# Patient Record
Sex: MALE
[De-identification: names, ages, dates, MRNs, and addresses within clinical notes are randomized per-mention and may not be internally consistent; named-entity substitution may affect disease eponyms.]

---

## 2018-07-23 ENCOUNTER — HOSPITAL ENCOUNTER (INPATIENT)
Dept: HOSPITAL 42 - ED | Age: 48
LOS: 2 days | Discharge: HOME | DRG: 603 | End: 2018-07-25
Attending: INTERNAL MEDICINE | Admitting: INTERNAL MEDICINE
Payer: MEDICAID

## 2018-07-23 VITALS — BODY MASS INDEX: 38 KG/M2
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
## 2018-07-23 DIAGNOSIS — K74.60: ICD-10-CM

## 2018-07-23 DIAGNOSIS — F10.10: ICD-10-CM
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
## 2018-07-23 DIAGNOSIS — G47.30: ICD-10-CM

## 2018-07-23 DIAGNOSIS — S81.812A: ICD-10-CM
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
## 2018-07-23 DIAGNOSIS — D61.818: ICD-10-CM

## 2018-07-23 DIAGNOSIS — Z82.49: ICD-10-CM

## 2018-07-23 DIAGNOSIS — L03.116: Primary | ICD-10-CM
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
## 2018-07-23 LAB
ALBUMIN SERPL-MCNC: 3.5 G/DL (ref 3–4.8)
ALBUMIN/GLOB SERPL: 0.9 {RATIO} (ref 1.1–1.8)
ALT SERPL-CCNC: 56 U/L (ref 7–56)
AST SERPL-CCNC: 135 U/L (ref 17–59)
BASOPHILS # BLD AUTO: 0.02 K/MM3 (ref 0–2)
BASOPHILS NFR BLD: 1.4 % (ref 0–3)
BILIRUB DIRECT SERPL-MCNC: 1.3 MG/DL (ref 0–0.4)
BUN SERPL-MCNC: 9 MG/DL (ref 7–21)
CALCIUM SERPL-MCNC: 8.4 MG/DL (ref 8.4–10.5)
EOSINOPHIL # BLD: 0.1 10*3/UL (ref 0–0.7)
EOSINOPHIL NFR BLD: 4.8 % (ref 1.5–5)
ERYTHROCYTE [DISTWIDTH] IN BLOOD BY AUTOMATED COUNT: 15 % (ref 11.5–14.5)
GFR NON-AFRICAN AMERICAN: > 60
GRANULOCYTES # BLD: 0.91 10*3/UL (ref 1.4–6.5)
GRANULOCYTES NFR BLD: 62.3 % (ref 50–68)
HGB BLD-MCNC: 13.4 G/DL (ref 14–18)
LYMPHOCYTES # BLD: 0.3 10*3/UL (ref 1.2–3.4)
LYMPHOCYTES NFR BLD AUTO: 19.9 % (ref 22–35)
MCH RBC QN AUTO: 31.2 PG (ref 25–35)
MCHC RBC AUTO-ENTMCNC: 35.1 G/DL (ref 31–37)
MCV RBC AUTO: 88.8 FL (ref 80–105)
MONOCYTES # BLD AUTO: 0.2 10*3/UL (ref 0.1–0.6)
MONOCYTES NFR BLD: 11.6 % (ref 1–6)
PLATELET # BLD: 47 10^3/UL (ref 120–450)
PMV BLD AUTO: 10 FL (ref 7–11)
RBC # BLD AUTO: 4.3 10^6/UL (ref 3.5–6.1)
WBC # BLD AUTO: 1.5 10^3/UL (ref 4.5–11)

## 2018-07-23 NOTE — CP.PCM.HP
<José Miguel Morris - Last Filed: 07/23/18 16:34>





History of Present Illness





- History of Present Illness


History of Present Illness: 


José Miguel Morris DO PGY-1, Family Medicine Resident


Medicine History and Physical





48 y o male PMhx sleep apnea, alcohol abuse presents to the ED c/o L anterior 

shin pain and swelling x 3 weeks. Pt states he was on top of a bar stool at 

home adjusting a vent on an air conditioning unit when he lost his balance, the 

stool fell underneath him, and hit his leg in the L anterior shin area. Pt 

states for the past several weeks, the area did not heal well. Pt c/o "scab" in 

area where he bumped his leg, denies bleeding, states the scab was there first, 

and then his leg became erythematous around it, from distal to his L knee to 

proximal to L lower anterior calf. Pt has only been applying neosporin to 

affected area. Pt states he does not follow with a PCP currently. Pt's 

girlfriend states at bedside that he came today because it was not improving, 

and that pt's girlfriend and pt's mother were concerned about the swelling. 

Denies fever or chills. Denies numbness/tingling/decreased sensation to 

affected area. States he has not had pain with ambulation since this started. 

Denies hx of this occurring before. Denies prior hx of cellulitis. Pt states he 

had bone marrow biopsy done 5 y ago as part of physical exam at [facility] and he had 

history of low WBCs. Pt admits to drinking a 6 pack of beer almost every day 

during the week, admits to occasionally mixing with liquor. Reports last drink 

of alcohol was at 2 am this morning. Denies headache, dizziness, palpitations, 

diaphoresis, chest pain, shortness of breath, n/v/d/c, abd pain, urinary 

complaints, tremors, or other symptoms.





PMhx: Sleep apnea, EtOH abuse


PSurgHx: denies


Allergies: NKDA


Meds: none


Fam hx: no pertinent family hx


Soc hx: sexually active with girlfriend, does not use condoms, denies hx of STDs

, last HIV test neg as per patient 4 mos ago





 





Present on Admission





- Present on Admission


Any Indicators Present on Admission: No





Review of Systems





- Constitutional


Constitutional: absent: As Per HPI, Anorexia, Chills, Daytime Sleepiness, 

Excessive Sweating, Fatigue, Fever, Frequent Falls, Headache, Increased Appetite

, Lethargy, Malaise, Night Sweats, Snoring, Sleep Apnea, Weight Gain, Weight 

Loss, Weakness, Other





- Cardiovascular


Cardiovascular: absent: As Per HPI, Acrocyanosis, Chest Pain, Chest Pain at Rest

, Chest Pain with Activity, Claudication, Diaphoresis, Dyspnea, Dyspnea on 

Exertion, Edema, Irregular Heart Rhythm, Pain Radiating to Arm/Neck/Jaw, Leg 

Edema, Leg Ulcers, Lightheadedness, Orthopnea, Palpitations, Paroxysmal 

Nocturnal Dyspnea, Pedal Edema, Radiating Pain, Rapid Heart Rate, Slow Heart 

Rate, Syncope, Other





- Respiratory


Respiratory: absent: As Per HPI, Cough, Dyspnea, Hemoptysis, Dyspnea on Exertion

, Wheezing, Snoring, Stridor, Pain on Inspiration, Chest Congestion, Excessive 

Mucous Production, Change in Mucous Color, Pain with Coughing, Other





- Gastrointestinal


Gastrointestinal: absent: As Per HPI, Abdominal Pain, Belching, Bloating, 

Change in Bowel Habits, Change in Stool Character, Coffee Ground Emesis, 

Constipation, Cramping, Diarrhea, Dyspepsia, Dysphagia, Early Satiety, 

Excessive Flatus, Fecal Incontinence, Heartburn, Hematemesis, Hematochezia, 

Loose Stools, Melena, Nausea, Odynophagia, Temesmus, Vomiting, Other





- Integumentary


Integumentary: Erythema, New Lesions





- Neurological


Neurological: absent: As Per HPI, Abnormal Gait, Abnormal Hearing, Abnormal 

Movements, Abnormal Speech, Behavioral Changes, Burning Sensations, Confusion, 

Convulsions, Disequilibrium, Dizziness, Numbness, Focal Weakness, Frequent Falls

, Headaches, Lack of Coordination, Loss of Vision, Memory Loss, Paresthesias, 

Radicular Pain, Restless Legs, Sensory Deficit, Syncope, Tingling, Tremor, 

Vertigo, Weakness, Other Visual Disturbances, Other





Past Patient History





- Infectious Disease


Hx of Infectious Diseases: None





- Past Social History


Smoking Status: Never Smoked





- PULMONARY


Hx Sleep Apnea: Yes





- PSYCHIATRIC


Hx Substance Use: No





- SURGICAL HISTORY


Hx Surgeries: No





Meds


Allergies/Adverse Reactions: 


 Allergies











Allergy/AdvReac Type Severity Reaction Status Date / Time


 


No Known Allergies Allergy   Verified 07/23/18 16:16














Physical Exam





- Constitutional


Appears: Non-toxic, No Acute Distress





- Head Exam


Head Exam: ATRAUMATIC, NORMAL INSPECTION, NORMOCEPHALIC





- Eye Exam


Eye Exam: EOMI, Normal appearance, PERRL


Additional comments: 


Negative for scleral icterus b/l





- ENT Exam


ENT Exam: Mucous Membranes Moist, Normal Oropharynx





- Neck Exam


Neck exam: Positive for: Full Rom, Normal Inspection





- Respiratory Exam


Respiratory Exam: Clear to Auscultation Bilateral, NORMAL BREATHING PATTERN





- Cardiovascular Exam


Cardiovascular Exam: REGULAR RHYTHM, +S1, +S2





- GI/Abdominal Exam


GI & Abdominal Exam: Normal Bowel Sounds, Soft





- Extremities Exam


Extremities exam: Positive for: full ROM, normal capillary refill, pedal pulses 

present


Additional comments: 


Lesion 2-3 cm in size surrounded by 10-14 cm area of erythema, no induration or 

fluctuance, firm, not warm to palpation, no abnormal discharge noted on exam





- Back Exam


Back exam: FULL ROM, NORMAL INSPECTION





- Neurological Exam


Neurological exam: Alert, CN II-XII Intact, Oriented x3





- Psychiatric Exam


Psychiatric exam: Normal Affect, Normal Mood





- Skin


Skin Exam: Dry, Intact, Warm





Results





- Vital Signs


Recent Vital Signs: 





 Last Vital Signs











Temp  97.9 F   07/23/18 11:51


 


Pulse  74   07/23/18 15:38


 


Resp  18   07/23/18 15:38


 


BP  127/54 L  07/23/18 15:38


 


Pulse Ox  100   07/23/18 15:38














- Labs


Result Diagrams: 


 07/23/18 12:30





 07/23/18 12:30





Assessment & Plan





- Assessment and Plan (Free Text)


Assessment: 


48 y o male PMhx sleep apnea, EtOH abuse, presenting with 3 weeks of L anterior 

shin pain and swelling x 3 weeks. Pt admitted for cellulitis and treatment with 

IV antibiotics. Pt to also be monitored for alcohol withdrawal symptoms. 


Plan: 


L lower leg cellulitis


S/p vancomycin in ED


Start clindamycin 600 mg IVPB q 8 h


Pt received tetanus booster vaccine in ED


Pt leukopenic, thrombocytopenic on admission, continue to trend


ID consulted, recs appreciated


Pt afebrile, vitals stable, continue to monitor


Blood cx ordered x2


Podiatry consulted not needed at this time due to XR not showing evidence of 

osteomyelitis in foot 





Leukopenic, thrombocytopenic


Continue to trend


Lovenox and Protonix held due to low cell counts, will reassess in am


Likely 2/2 to EtOH abuse vs. immunocompromised state


HIV, alcohol level pending





Alcohol withdrawal


Last drink consumed by pt at 2 am this morning


Alcohol level pending


CIWA protocol


Ativan 2 mg q 6 h PRN


Seizure/fall/aspiration precautions


Regular diet


Pt not demonstrating signs or symptoms of withdrawal at this time, continue to 

monitor





Elevated AST


Likely 2/2 to EtOH abuse


Hepatitis panel pending


Pt not c/o RUQ pain


Continue to trend





Hyperbilirubinemia


Continue to trend


Pt not c/o RUQ pain





GI/DVT ppx: Protonix and Lovenox held due to low cell counts, will reassess in 

am. Pt encouraged to ambulate for DVT ppx.





Pt seen, examined with, and plan discussed with Dr. Carlson, attending.





José Miguel Morris DO PGY-1, Family Medicine Resident


Pager #[phone]





<Dana Carlson - Last Filed: 07/23/18 17:26>





Results





- Vital Signs


Recent Vital Signs: 





 Last Vital Signs











Temp  97.9 F   07/23/18 11:51


 


Pulse  74   07/23/18 15:38


 


Resp  18   07/23/18 15:38


 


BP  127/54 L  07/23/18 15:38


 


Pulse Ox  100   07/23/18 15:38














- Labs


Result Diagrams: 


 07/23/18 12:30





 07/23/18 12:30





Attending/Attestation





- Attestation


I have personally seen and examined this patient.: Yes


I have fully participated in the care of the patient.: Yes


I have reviewed all pertinent clinical information: Yes


Notes (Text): 





07/23/18 17:22


48 year old male with past medical history of alcohol abuse who presents with 

left anterior shin cellulitis.


Xray was negative for evidence of osteomyelitis and LE doppler was also 

negative.


He is started on iv antibiotics.  ID evaluation is requested.


He has leukopenia and thrombocytopenia, possibly secondary to ETOH bone marrow 

suppression vs infection.


HIV and hepatitis panel is ordered.  Monitor LFTs.  Alcohol level and urine 

drug screen is ordered.


Monitor for alcohol withdrawal symptoms.  He was counselled on alcohol 

abstinence.





Dana Carlson MD


Hospitalist.

## 2018-07-23 NOTE — RAD
Date of service: 



07/23/2018



PROCEDURE:  Radiographs of the left tibia and fibula. 



HISTORY:

lt. tibial swelling



COMPARISON:

None available.



TECHNIQUE:

Frontal and lateral views obtained. 



FINDINGS:



BONES:

No fracture or destructive lesion.



JOINT SPACES:

Unremarkable.



OTHER FINDINGS:

None.



IMPRESSION:

Unremarkable radiographs of the left tibia and fibula.

## 2018-07-23 NOTE — ED PDOC
Arrival/HPI





- General


Chief Complaint: Abnormal Skin Integrity


Time Seen by Provider: 07/23/18 11:54


Historian: Patient





- History of Present Illness


Narrative History of Present Illness (Text): 





07/23/18 12:09


49 y/o male, no significant pmh, nkda, last tetanus over 10 years ago, c/o lt. 

anterior shin pain and swelling x 3 weeks.  Pt. stated that he accidentally hit 

on the wooden chair corner about 3 weeks ago, sustained abrasion which he has 

been self treating with the neosporin at home, still not healing, no fever or 

chills, no night sweat, no rash, no numbness or tingling, no palpitation, no 

other medical or psychological complaints. 





Past Medical History





- Provider Review


Nursing Documentation Reviewed: Yes





- Infectious Disease


Hx of Infectious Diseases: None





- Pulmonary


Hx Sleep Apnea: Yes





- Psychiatric


Hx Substance Use: No





Family/Social History





- Physician Review


Nursing Documentation Reviewed: Yes


Family/Social History: Unknown Family HX


Smoking Status: Never Smoked


Hx Alcohol Use: Yes


Frequency of alcohol use: Socially


Hx Substance Use: No





Allergies/Home Meds


Allergies/Adverse Reactions: 


Allergies





No Known Allergies Allergy (Verified 07/23/18 11:50)


 








Home Medications: 


 Home Meds











 Medication  Instructions  Recorded  Confirmed


 


No Known Home Med  07/23/18 07/23/18














Review of Systems





- Review of Systems


Constitutional: absent: Fatigue, Fevers


Eyes: absent: Vision Changes


ENT: absent: Hearing Changes


Respiratory: absent: SOB, Cough


Cardiovascular: absent: Chest Pain


Gastrointestinal: absent: Abdominal Pain, Nausea, Vomiting


Skin: Rash, Skin Lesions, Cellulitis.  absent: Pruritis, Laceration, Abscess, 

Ulcer


Neurological: absent: Headache, Dizziness


Psychiatric: absent: Anxiety, Depression, Suicidal Ideation





Physical Exam


Vital Signs Reviewed: Yes


Vital Signs











  Temp Pulse Resp BP Pulse Ox


 


 07/23/18 11:51  97.9 F  82  18  142/80  96











Temperature: Afebrile


Blood Pressure: Normal


Pulse: Regular


Respiratory Rate: Normal


Appearance: Positive for: Well-Appearing, Non-Toxic, Comfortable


Pain Distress: Mild


Mental Status: Positive for: Alert and Oriented X 3





- Systems Exam


Head: Present: Atraumatic, Normocephalic


Pupils: Present: PERRL


Extroacular Muscles: Present: EOMI


Conjunctiva: Present: Normal


Mouth: Present: Moist Mucous Membranes


Neck: Present: Normal Range of Motion


Respiratory/Chest: Present: Clear to Auscultation, Good Air Exchange.  No: 

Respiratory Distress, Accessory Muscle Use


Cardiovascular: Present: Regular Rate and Rhythm, Normal S1, S2.  No: Murmurs


Abdomen: No: Tenderness, Distention, Peritoneal Signs


Back: Present: Normal Inspection


Upper Extremity: Present: Normal Inspection.  No: Cyanosis, Edema


Lower Extremity: Present: Normal Inspection, Other (Lt. shin: anteriorly noted 

to have 4cm superficial healing abrasion with surround erythematous approx. 

15wyt84mo superficial cellulitis, no streaking or ulcers, FROM without 

limitation, sensation intact, motor 5/5, +DPPT pulses, capillary refill< 2 

seconds, neurovasuclar intact. ).  No: Edema


Neurological: Present: GCS=15, CN II-XII Intact, Speech Normal


Skin: Present: Warm, Dry, Normal Color.  No: Rashes


Psychiatric: Present: Alert, Oriented x 3, Normal Insight, Normal Concentration





Medical Decision Making


ED Course and Treatment: 





07/23/18 12:18


Differential: DVT vs. Cellulitis vs. Fracture/foreign bodies


-The wound doesn't feel or palpate any foreign bodies


-Labs


-LLE venuous dopper


-Lt. tibia/fibula


-IV vancomyin/toradol/benadryl


-observe and reassess





07/23/18 14:25


-Lt.tibia/fibula xray: Unremarkable radiographs of the left tibia and fibula.


-LLE Venuous doppler: as per preliminary report, no acute DVT


-Labs are non-significant except wbc 1.5/platete 47, magnesium 2.3 with no 

previous comparisons


-Pt. has extensive cellulitis, needs IV antibiotic and observation


-Paging hospitalist for admission. 





07/23/18 14:46


-I spoke to the hospitalist Dr. Carlson, discussed about the case/labs/radiology 

result, agreed to admit to his service.


-Dr. Scanlon is busy, will put in the admission order for him for now. 








- Lab Interpretations


Lab Results: 








 07/23/18 12:30 





 07/23/18 12:30 





 Lab Results





07/23/18 12:30: WBC 1.5 L*, RBC 4.30, Hgb 13.4 L, Hct 38.2 L, MCV 88.8, MCH 31.2

, MCHC 35.1, RDW 15.0 H, Plt Count 47 L*, MPV 10.0, Gran % 62.3, Lymph % (Auto) 

19.9 L, Mono % (Auto) 11.6 H, Eos % (Auto) 4.8, Baso % (Auto) 1.4, Gran # 0.91 L

, Lymph # (Auto) 0.3 L, Mono # (Auto) 0.2, Eos # (Auto) 0.1, Baso # (Auto) 0.02


07/23/18 12:30: Sodium 142, Potassium 4.3, Chloride 106, Carbon Dioxide 28, 

Anion Gap 12, BUN 9, Creatinine 0.7 L, Est GFR ( Amer) > 60, Est GFR (Non

-Af Amer) > 60, Random Glucose 97, Calcium 8.4, Total Bilirubin 2.3 H,  H

, ALT 56, Alkaline Phosphatase 227 H, Total Protein 7.6, Albumin 3.5, Globulin 

4.1, Albumin/Globulin Ratio 0.9 L








I have reviewed the lab results: Yes





- RAD Interpretation


Radiology Orders: 








07/23/18 12:06


TIBIA FIBULA LEFT [RAD] Stat 


DUPLEX LOWER EXTRM VEIN LEFT [US] Stat 











Lt. tibia/fibula xray:





Date of service: 





07/23/2018





PROCEDURE:  Radiographs of the left tibia and fibula. 





HISTORY:


lt. tibial swelling





COMPARISON:


None available.





TECHNIQUE:


Frontal and lateral views obtained. 





FINDINGS:





BONES:


No fracture or destructive lesion.





JOINT SPACES:


Unremarkable.





OTHER FINDINGS:


None.





IMPRESSION:


Unremarkable radiographs of the left tibia and fibula.





--------------------------------------------------------------------------------

----------------------


LLE Venuous doppler: as per preliminary report, no acute DVT





: Radiologist





- Medication Orders


Current Medication Orders: 











Discontinued Medications





Diphenhydramine HCl (Benadryl)  25 mg IVP STAT STA


   Stop: 07/23/18 12:07


   Last Admin: 07/23/18 12:34  Dose: 25 mg





IVP Administration


 Document     07/23/18 12:34  EQ  (Rec: 07/23/18 12:34  EQ  [facility]


     Charges for Administration


      # of IVP Administrations                   1





Vancomycin HCl (Vancomycin 1gm)  1 gm in 250 mls @ 167 mls/hr IVPB STAT STA


   PRN Reason: Protocol


   Stop: 07/23/18 13:35


   Last Admin: 07/23/18 12:33  Dose: 167 mls/hr





eMAR Start Stop


 Document     07/23/18 12:33  EQ  (Rec: 07/23/18 12:33  EQ  [facility]


     Intravenous Solution


      Start Date                                 07/23/18


      Start Time                                 12:33





Ketorolac Tromethamine (Toradol)  30 mg IVP STAT STA


   Stop: 07/23/18 12:07


   Last Admin: 07/23/18 12:34  Dose: 30 mg





MAR Pain Assessment


 Document     07/23/18 12:34  EQ  (Rec: 07/23/18 12:34  EQ  [facility]


     Pain Reassessment


      Is this a pain reassessment?               No


     Sleep


      Is patient sleeping during reassessment?   No


     Presence of Pain


      Presence of Pain                           Yes


IVP Administration


 Document     07/23/18 12:34  EQ  (Rec: 07/23/18 12:34  EQ  [facility]


     Charges for Administration


      # of IVP Administrations                   1





Tetanus/Reduced Diphtheria/Acell Pertussis (Boostrix Vaccine Inj)  0.5 ml IM 

.ONCE ONE


   Stop: 07/23/18 12:16


   Last Admin: 07/23/18 12:33  Dose: 0.5 ml











- PA / NP / Resident Statement


MD/DO has reviewed & agrees with the documentation as recorded.





Disposition/Present on Arrival





- Present on Arrival


Any Indicators Present on Arrival: No


History of DVT/PE: No


History of Uncontrolled Diabetes: No


Urinary Catheter: No


History of Decub. Ulcer: No


History Surgical Site Infection Following: None





- Disposition


Have Diagnosis and Disposition been Completed?: Yes


Diagnosis: 


 Infected abrasion, Cellulitis, Thrombocytopenia, Leukopenia





Disposition: HOSPITALIZED


Disposition Time: 14:27


Patient Plan: Admission, Observation


Patient Problems: 


 Current Active Problems











Problem Status Onset


 


Infected abrasion Acute  


 


Cellulitis Acute  











Condition: STABLE


Discharge Instructions (ExitCare):  Cellulitis (ED)


Forms:  [facility] (English)

## 2018-07-23 NOTE — US
PROCEDURE:  Left lower extremity venous US



HISTORY:

Leg pain and swelling. Evaluate for DVT.



PHYSICIAN(S):  Dmitriy Peralta MD.



TECHNIQUE:

Duplex sonography and color-flow Doppler with graded compression were 

used to evaluate the deep venous system of the left lower extremity. 



FINDINGS:

The visualized deep venous system of the left lower extremity is 

sonographically normal and compressible. Normal wave forms and 

augmentation are seen. There is no sonographic evidence for deep 

venous thrombosis in the visualized segments of the left lower 

extremity.



IMPRESSION:

1. No sonographic evidence for deep venous thrombosis in the 

visualized segments of the left lower extremity.

## 2018-07-23 NOTE — CP.PCM.CON
History of Present Illness





- History of Present Illness


History of Present Illness: 


Infectious Disease Consultation:


July 23, 2018





49 yo  male with PMhx sleep apnea, alcohol abuse presents to the ED c/o 

L anterior shin pain and swelling x 3 weeks. Pt states he was on top of a bar 

stool at home adjusting a vent on an air conditioning unit when he lost his 

balance, the stool fell underneath him, and hit his leg in the L anterior shin 

area. Pt states for the past several weeks, the area did not heal well. Pt c/o 

"scab" in area where he bumped his leg, denies bleeding, states the scab was 

there first, and then his leg became erythematous around it, from distal to his 

L knee to proximal to L lower anterior calf. Pt has only been applying 

neosporin to affected area. Patient's girlfriend states that the laceration/

scab has not changed in size from the time of injury.  Pt states he does not 

follow with a PCP currently but was recently at Dr. Brady's office for 

sleep apnea testing and evaluation in June 2018. Pt's girlfriend states at 

bedside that he came today because it was not improving over the past 3 weeks, 

and that pt's girlfriend and pt's mother were concerned about the swelling. 

Denies fever or chills. Denies numbness/tingling/decreased sensation to 

affected area. States he has not had pain with ambulation since this started. 

Denies hx of this occurring before. Denies prior hx of cellulitis. Pt states he 

had bone marrow biopsy done 5 y ago as part of physical exam at [facility] [place], [place] and he had 

history of low WBCs. Pt admits to drinking a 6 pack of beer almost every day 

during the week, admits to occasionally mixing with liquor. Reports last drink 

of alcohol was at 2 am this morning. Denies headache, dizziness, palpitations, 

diaphoresis, chest pain, shortness of breath, n/v/d/c, abd pain, urinary 

complaints, tremors, or other symptoms.





PMHx: 


Sleep apnea, EtOH abuse, Circulation issues





PSHx:


None given.





Allergies: 


NKDA





Meds: 


none





Fam hx:


HTN - mother





Soc hx:


sexually active with girlfriend, does not use condoms, denies hx of STDs, last 

HIV test neg as per patient 4 mos ago


No tobacco or illicit drug use





ROS:


No fevers, chills, nausea, vomiting, diarrhea, headaches, dizziness, chest pain

, abdominal pain, melena, hematuria, hematemesis, hematochezia, depression, 

anxiety





Past Patient History





- Infectious Disease


Hx of Infectious Diseases: None





- Past Social History


Smoking Status: Never Smoked





- PULMONARY


Hx Sleep Apnea: Yes





- PSYCHIATRIC


Hx Substance Use: No





- SURGICAL HISTORY


Hx Surgeries: No





Meds


Allergies/Adverse Reactions: 


 Allergies











Allergy/AdvReac Type Severity Reaction Status Date / Time


 


No Known Allergies Allergy   Verified 07/23/18 16:16














- Medications


Medications: 


 Current Medications





Clindamycin Phosphate 600 mg/ (Sodium Chloride)  54 mls @ 102 mls/hr IVPB Q8 MONY


   PRN Reason: Protocol


   Last Admin: 07/23/18 16:41 Dose:  102 mls/hr


Lorazepam (Ativan)  2 mg IVP Q6H PRN; Protocol


   PRN Reason: Agitation











Physical Exam





- Constitutional


Appears: Non-toxic, No Acute Distress





- Head Exam


Head Exam: ATRAUMATIC, NORMOCEPHALIC


Additional comments: 


detentition below average.





- Eye Exam


Eye Exam: EOMI, PERRL


Pupil Exam: NORMAL ACCOMODATION, PERRL





- ENT Exam


ENT Exam: Mucous Membranes Moist, Normal External Ear Exam, TM's Normal 

Bilaterally





- Respiratory Exam


Respiratory Exam: Clear to Auscultation Bilateral, NORMAL BREATHING PATTERN.  

absent: Rales, Rhonchi, Wheezes





- Cardiovascular Exam


Cardiovascular Exam: REGULAR RHYTHM, RRR, +S1, +S2





- GI/Abdominal Exam


GI & Abdominal Exam: Normal Bowel Sounds, Soft.  absent: Distended, Tenderness





- Extremities Exam


Extremities exam: Positive for: full ROM


Additional comments: 





mild chronic venous stasis changes.  Scabbed laceration about 4 cm in length on 

anterior left lower leg.  No current drainage.  At time of my exam, no 

additional warmth or tenderness.





- Neurological Exam


Neurological exam: Alert, CN II-XII Intact, Oriented x3





- Psychiatric Exam


Psychiatric exam: Normal Affect, Normal Mood





- Skin


Skin Exam: Normal Color


Additional comments: 





except for anterior lower left leg with 4 cm laceration that is scabbed over.





Results





- Vital Signs


Recent Vital Signs: 


 Last Vital Signs











Temp  97.9 F   07/23/18 11:51


 


Pulse  74   07/23/18 15:38


 


Resp  18   07/23/18 15:38


 


BP  127/54 L  07/23/18 15:38


 


Pulse Ox  100   07/23/18 15:38














- Labs


Result Diagrams: 


 07/23/18 12:30





 07/23/18 12:30





Assessment & Plan





- Assessment and Plan (Free Text)


Assessment: 





49 yo  male with injury to left leg 3 weeks ago when he fell off a bar 

stool while trying to fix something in the house.  As per wife, the left leg 

has had a non-healing laceration on the anterior lower left leg.  Patient was 

only using Neosporin ointment for treatment.  Currently he has no major 

complaints.  Local wound care.  On Clindamycin for antibiotic care at this 

time.  Can continue with Clindamycin.





IF leg continues to improve, can consider use of Clindamycin 450 mg PO TID to 

continue treatment at this time.  Supportive care.





Thank you for allowing me to participate in the care of the patient, we will 

follow with you.

## 2018-07-24 VITALS — RESPIRATION RATE: 20 BRPM

## 2018-07-24 LAB
ALBUMIN SERPL-MCNC: 3.4 G/DL (ref 3–4.8)
ALBUMIN/GLOB SERPL: 0.8 {RATIO} (ref 1.1–1.8)
ALT SERPL-CCNC: 50 U/L (ref 7–56)
AST SERPL-CCNC: 118 U/L (ref 17–59)
BASOPHILS # BLD AUTO: 0.01 K/MM3 (ref 0–2)
BASOPHILS NFR BLD: 0.7 % (ref 0–3)
BUN SERPL-MCNC: 9 MG/DL (ref 7–21)
CALCIUM SERPL-MCNC: 8.6 MG/DL (ref 8.4–10.5)
EOSINOPHIL # BLD: 0.1 10*3/UL (ref 0–0.7)
EOSINOPHIL NFR BLD: 4.9 % (ref 1.5–5)
ERYTHROCYTE [DISTWIDTH] IN BLOOD BY AUTOMATED COUNT: 14.6 % (ref 11.5–14.5)
GFR NON-AFRICAN AMERICAN: > 60
GRANULOCYTES # BLD: 0.99 10*3/UL (ref 1.4–6.5)
GRANULOCYTES NFR BLD: 68.7 % (ref 50–68)
HEPATITIS A IGM: NEGATIVE
HEPATITIS B CORE AB: NEGATIVE
HEPATITIS C ANTIBODY: NEGATIVE
HGB BLD-MCNC: 13.5 G/DL (ref 14–18)
LYMPHOCYTES # BLD: 0.2 10*3/UL (ref 1.2–3.4)
LYMPHOCYTES NFR BLD AUTO: 15.3 % (ref 22–35)
MCH RBC QN AUTO: 31 PG (ref 25–35)
MCHC RBC AUTO-ENTMCNC: 35.2 G/DL (ref 31–37)
MCV RBC AUTO: 88.1 FL (ref 80–105)
MONOCYTES # BLD AUTO: 0.2 10*3/UL (ref 0.1–0.6)
MONOCYTES NFR BLD: 10.4 % (ref 1–6)
PLATELET # BLD EST: (no result) 10*3/UL
PLATELET # BLD: 44 10^3/UL (ref 120–450)
PMV BLD AUTO: 10.6 FL (ref 7–11)
RBC # BLD AUTO: 4.36 10^6/UL (ref 3.5–6.1)
WBC # BLD AUTO: 1.4 10^3/UL (ref 4.5–11)

## 2018-07-24 NOTE — CP.PCM.PN
<José Miguel Morris - Last Filed: 07/24/18 15:52>





Subjective





- Date & Time of Evaluation


Date of Evaluation: 07/24/18


Time of Evaluation: 07:45





- Subjective


Subjective: 


José Miguel Morris DO PGY-1, Family Medicine Resident


Medicine Progress Note





Pt seen and examined at bedside. Reports he has been ambulating out of bed to 

bathroom without concerns. Tolerating PO diet, denies pain in affected leg with 

cellulitis. No acute events reported overnight. 





Objective





- Vital Signs/Intake and Output


Vital Signs (last 24 hours): 


 











Temp Pulse Resp BP Pulse Ox


 


 97.9 F   68   18   110/75   97 


 


 07/24/18 14:00  07/24/18 14:00  07/24/18 14:00  07/24/18 14:00  07/24/18 14:00








Intake and Output: 


 











 07/24/18 07/24/18





 06:59 18:59


 


Intake Total 660 300


 


Balance 660 300














- Medications


Medications: 


 Current Medications





Clindamycin Phosphate 600 mg/ (Sodium Chloride)  54 mls @ 102 mls/hr IVPB Q8 MONY


   PRN Reason: Protocol


   Last Admin: 07/24/18 13:40 Dose:  102 mls/hr


Lorazepam (Ativan)  2 mg IVP Q6H PRN; Protocol


   PRN Reason: Agitation











- Labs


Labs: 


 





 07/24/18 05:00 





 07/24/18 05:00 











- Constitutional


Appears: Non-toxic, No Acute Distress





- Eye Exam


Eye Exam: EOMI, Normal appearance, PERRL





- ENT Exam


ENT Exam: Mucous Membranes Moist, Normal Oropharynx





- Neck Exam


Neck Exam: Full ROM, Normal Inspection





- Respiratory Exam


Respiratory Exam: Clear to Ausculation Bilateral, NORMAL BREATHING PATTERN





- Cardiovascular Exam


Cardiovascular Exam: REGULAR RHYTHM, +S1, +S2





- GI/Abdominal Exam


GI & Abdominal Exam: Soft, Normal Bowel Sounds





- Extremities Exam


Extremities Exam: Full ROM, Normal Capillary Refill, Normal Inspection


Additional comments: 


Improving LLE cellulitis, c/d/i, no discharge, no fluctuance, decreased size of 

erythema around site





- Neurological Exam


Neurological Exam: Alert, Awake, CN II-XII Intact, Normal Gait, Oriented x3





- Psychiatric Exam


Psychiatric exam: Normal Affect, Normal Mood





- Skin


Skin Exam: Dry, Intact, Warm





Assessment and Plan





- Assessment and Plan (Free Text)


Assessment: 


48 y o male PMhx sleep apnea, EtOH abuse, presenting with 3 weeks of L anterior 

shin pain and swelling x 3 weeks. Pt admitted for cellulitis and treatment with 

IV antibiotics. Pt being monitored for alcohol withdrawal symptoms. 


Plan: 


L lower leg cellulitis


S/p vancomycin in ED


C/w clindamycin 600 mg IVPB q 8 h


Per ID rec: pt can be discharged on clindamycin 450 mg PO tid for total course 

of 14 days of treatment


Pt received tetanus booster vaccine, pneumococcal vaccine in ED


Pt leukopenic, thrombocytopenic on admission, likely chronic


Reviewed lab results and bone marrow bx from Dr. Pompa's office (Heme/Onc), pt 

has hx chronic pancytopenia


ID consulted, recs appreciated


Pt afebrile, vitals stable, continue to monitor


Blood cx ordered x2


Podiatry consulted not needed at this time due to XR not showing evidence of 

osteomyelitis in foot 





Leukopenic, thrombocytopenic


Continue to trend


Lovenox and Protonix held due to low cell counts, will reassess in am


Likely 2/2 to EtOH abuse vs. immunocompromised state


HIV test neg, alcohol level elevated


Heme/Onc consulted, recs appreciated





Alcohol withdrawal


Last drink consumed by pt at 2 am on day of admission


Alcohol level elevated


CIWA protocol


Ativan 2 mg q 6 h PRN


Seizure/fall/aspiration precautions


Regular diet


Pt not demonstrating signs or symptoms of withdrawal at this time, continue to 

monitor





Elevated AST


Likely 2/2 to EtOH abuse


Hepatitis panel neg


Pt not c/o RUQ pain


U/s spleen/liver pending


Continue to trend





Hyperbilirubinemia


Continue to trend


Pt not c/o RUQ pain


Pending spleen/liver sono





GI/DVT ppx: Protonix and Lovenox held due to low cell counts, will reassess. Pt 

encouraged to ambulate for DVT ppx.





Pt seen, examined with, and plan discussed with Dr. Lopez, attending. 





José Miguel Morris DO PGY-1, Family Medicine Resident


Pager #[phone]





<Reggie Lopez - Last Filed: 07/25/18 13:16>





Objective





- Vital Signs/Intake and Output


Vital Signs (last 24 hours): 


 











Temp Pulse Resp BP Pulse Ox


 


 98.5 F   71   20   116/71   94 L


 


 07/25/18 06:00  07/25/18 06:00  07/25/18 06:00  07/25/18 06:00  07/25/18 06:00








Intake and Output: 


 











 07/25/18 07/25/18





 06:59 18:59


 


Intake Total 840 


 


Balance 840 














- Medications


Medications: 


 Current Medications





Clindamycin Phosphate 600 mg/ (Sodium Chloride)  54 mls @ 102 mls/hr IVPB Q8 MONY


   PRN Reason: Protocol


   Last Admin: 07/25/18 05:36 Dose:  102 mls/hr


Lorazepam (Ativan)  2 mg IVP Q6H PRN; Protocol


   PRN Reason: Agitation











- Labs


Labs: 


 





 07/25/18 07:50 





 07/25/18 07:50 











Attending/Attestation





- Attestation


I have personally seen and examined this patient.: Yes


I have fully participated in the care of the patient.: Yes


I have reviewed all pertinent clinical information, including history, physical 

exam and plan: Yes


Notes (Text): 





07/25/18 13:13





Medical record note made by the resident after discussion with my direction and 

input after the patient was personally seen and examined by me. I have reviewed 

the chart and agree that the record accurately reflects by personal performance 

of the history, physical exam, data review, and medical decision-making, in the 

course for the patient. I have also personally directed the plan of care.


48 year old male with past medical history of alcohol abuse who presents with 

left anterior shin cellulitis.Xray was negative for evidence of osteomyelitis 

and LE Doppler was also negative.Patient is responding well to IV antibiotics, 

redness and swelling is improving.





Pancytopenia is due to bone suppression by alcohol and underlying chronic liver 

disease, we will monitor and get patient medical record from patient 

hematologist.





There is no sign of alcohol withdrawal at this time.





The issue of ongoing alcohol abuse was discussed in detail with patient.





Management plan was discussed in detail with patient. Education was provided.

## 2018-07-24 NOTE — US
Date of service: 



07/24/2018



HISTORY:

hyperbilirubin pancytopenia



COMPARISON:

None.



TECHNIQUE:

Sonographic evaluation of the abdomen.



FINDINGS:



LIVER:

Measures 15.9 cm.  Heterogeneously increased echogenicity of the 

liver parenchyma. Nodular contour consistent with hepatic cirrhosis. 

No mass.  No biliary ductal dilatation.



GALLBLADDER:

Unremarkable. No gallstones.



COMMON BILE DUCT:

Measures 5 mm. No stones. No dilatation.



PANCREAS:

Unremarkable as visualized. No mass. No ductal dilatation.



RIGHT KIDNEY:

Measures 13.0cm. Normal echogenicity. No calculus, mass, or 

hydronephrosis.



LEFT KIDNEY:

Measures 14.1cm. Normal echogenicity. No calculus, mass, or 

hydronephrosis.



SPLEEN:

Splenomegaly.  The spleen measures 21.5 cm in greatest dimension.



AORTA:

No aneurysmal dilatation. 



IVC:

Unremarkable. 



OTHER FINDINGS:

Varices noted in left upper quadrant of abdomen. 



IMPRESSION:

Hepatic cirrhosis. Varices.  Splenomegaly. No evidence of biliary 

obstruction.

## 2018-07-24 NOTE — CP.PCM.PN
Subjective





- Date & Time of Evaluation


Date of Evaluation: 07/24/18


Time of Evaluation: 13:30





- Subjective


Subjective: 


Infectious Disease Follow Up:


July 24, 2018





47 yo  male with PMhx sleep apnea, alcohol abuse presents to the ED c/o 

L anterior shin pain and swelling x 3 weeks. Pt states he was on top of a bar 

stool at home adjusting a vent on an air conditioning unit when he lost his 

balance, the stool fell underneath him, and hit his leg in the L anterior shin 

area. Pt states for the past several weeks, the area did not heal well. Pt c/o 

"scab" in area where he bumped his leg, denies bleeding, states the scab was 

there first, and then his leg became erythematous around it, from distal to his 

L knee to proximal to L lower anterior calf. Pt has only been applying 

neosporin to affected area. Patient's girlfriend states that the laceration/

scab has not changed in size from the time of injury.  Pt states he does not 

follow with a PCP currently but was recently at Dr. Brady's office for 

sleep apnea testing and evaluation in June 2018. Pt's girlfriend states at 

bedside that he came today because it was not improving over the past 3 weeks, 

and that pt's girlfriend and pt's mother were concerned about the swelling. 

Denies fever or chills. Denies numbness/tingling/decreased sensation to 

affected area. States he has not had pain with ambulation since this started. 

Denies hx of this occurring before. Denies prior hx of cellulitis. Pt states he 

had bone [place] 5 [place] [facility] [place] 

history of low WBCs. Pt admits to drinking a 6 pack of beer almost every day 

during the week, admits to occasionally mixing with liquor. Reports last drink 

of alcohol was at 2 am this morning. Denies headache, dizziness, palpitations, 

diaphoresis, chest pain, shortness of breath, n/v/d/c, abd pain, urinary 

complaints, tremors, or other symptoms.





The patient with no erythema, additional warmth, or palpational tenderness.  No 

drainage from the large laceration that is scabbed over.  Patient still with 

pain at site.  However no issues with walking or gait.





Objective





- Vital Signs/Intake and Output


Vital Signs (last 24 hours): 


 











Temp Pulse Resp BP Pulse Ox


 


 97.9 F   89   18   130/80   96 


 


 07/24/18 06:00  07/24/18 06:00  07/24/18 06:00  07/24/18 06:00  07/24/18 06:00








Intake and Output: 


 











 07/24/18 07/24/18





 06:59 18:59


 


Intake Total 660 


 


Balance 660 














- Medications


Medications: 


 Current Medications





Clindamycin Phosphate 600 mg/ (Sodium Chloride)  54 mls @ 102 mls/hr IVPB Q8 MONY


   PRN Reason: Protocol


   Last Admin: 07/24/18 05:31 Dose:  102 mls/hr


Lorazepam (Ativan)  2 mg IVP Q6H PRN; Protocol


   PRN Reason: Agitation











- Labs


Labs: 


 





 07/24/18 05:00 





 07/24/18 05:00 











- Constitutional


Appears: Non-toxic, No Acute Distress





- Head Exam


Head Exam: ATRAUMATIC, NORMOCEPHALIC


Additional comments: 





dentition below average.





- Eye Exam


Eye Exam: EOMI, PERRL


Pupil Exam: NORMAL ACCOMODATION, PERRL





- ENT Exam


ENT Exam: Mucous Membranes Moist, Normal External Ear Exam, TM's Normal 

Bilaterally





- Neck Exam


Neck Exam: Full ROM, Normal Inspection





- Respiratory Exam


Respiratory Exam: Clear to Ausculation Bilateral, NORMAL BREATHING PATTERN.  

absent: Rales, Rhonchi, Wheezes





- Cardiovascular Exam


Cardiovascular Exam: REGULAR RHYTHM, RRR, +S1, +S2





- GI/Abdominal Exam


GI & Abdominal Exam: Soft, Normal Bowel Sounds.  absent: Distended, Tenderness





- Extremities Exam


Extremities Exam: Full ROM


Additional comments: 


mild chronic venous stasis changes.  Scabbed laceration about 4 cm in length on 

anterior left lower leg.  No current drainage.  At time of my exam, no 

additional warmth or tenderness.





- Neurological Exam


Neurological Exam: Alert, Awake, CN II-XII Intact, Oriented x3





- Psychiatric Exam


Psychiatric exam: Normal Affect, Normal Mood





- Skin


Skin Exam: Normal Color


Additional comments: 


except for anterior lower left leg with 4 cm laceration that is scabbed over.





Assessment and Plan





- Assessment and Plan (Free Text)


Assessment: 


47 yo  male with injury to left leg 3 weeks ago when he fell off a bar 

stool while trying to fix something in the house.  As per wife, the left leg 

has had a non-healing laceration on the anterior lower left leg.  Patient was 

only using Neosporin ointment for treatment.  Currently he has no major 

complaints.  Local wound care.  On Clindamycin for antibiotic care at this 

time.  Can continue with Clindamycin.





IF leg continues to improve, can consider use of Clindamycin 450 mg PO TID to 

continue treatment on discharge for at least 14 days of treatment.  Supportive 

care.





Thank you for allowing me to participate in the care of the patient, we will 

follow with you.

## 2018-07-25 VITALS
OXYGEN SATURATION: 94 % | HEART RATE: 71 BPM | SYSTOLIC BLOOD PRESSURE: 116 MMHG | TEMPERATURE: 98.5 F | DIASTOLIC BLOOD PRESSURE: 71 MMHG

## 2018-07-25 LAB
ALBUMIN SERPL-MCNC: 3.3 G/DL (ref 3–4.8)
ALBUMIN/GLOB SERPL: 0.8 {RATIO} (ref 1.1–1.8)
ALT SERPL-CCNC: 48 U/L (ref 7–56)
AST SERPL-CCNC: 108 U/L (ref 17–59)
BASOPHILS # BLD AUTO: 0.01 K/MM3 (ref 0–2)
BASOPHILS NFR BLD: 0.5 % (ref 0–3)
BUN SERPL-MCNC: 9 MG/DL (ref 7–21)
CALCIUM SERPL-MCNC: 8.6 MG/DL (ref 8.4–10.5)
EOSINOPHIL # BLD: 0.2 10*3/UL (ref 0–0.7)
EOSINOPHIL NFR BLD: 7.4 % (ref 1.5–5)
ERYTHROCYTE [DISTWIDTH] IN BLOOD BY AUTOMATED COUNT: 14.6 % (ref 11.5–14.5)
GFR NON-AFRICAN AMERICAN: > 60
GRANULOCYTES # BLD: 1.32 10*3/UL (ref 1.4–6.5)
GRANULOCYTES NFR BLD: 65.4 % (ref 50–68)
HGB BLD-MCNC: 14.2 G/DL (ref 14–18)
LYMPHOCYTES # BLD: 0.4 10*3/UL (ref 1.2–3.4)
LYMPHOCYTES NFR BLD AUTO: 17.3 % (ref 22–35)
MCH RBC QN AUTO: 30.9 PG (ref 25–35)
MCHC RBC AUTO-ENTMCNC: 35.3 G/DL (ref 31–37)
MCV RBC AUTO: 87.4 FL (ref 80–105)
MONOCYTES # BLD AUTO: 0.2 10*3/UL (ref 0.1–0.6)
MONOCYTES NFR BLD: 9.4 % (ref 1–6)
PLATELET # BLD EST: (no result) 10*3/UL
PLATELET # BLD: 45 10^3/UL (ref 120–450)
PMV BLD AUTO: 9.6 FL (ref 7–11)
RBC # BLD AUTO: 4.6 10^6/UL (ref 3.5–6.1)
WBC # BLD AUTO: 2 10^3/UL (ref 4.5–11)

## 2018-07-25 NOTE — CP.PCM.DIS
<José Miguel Morris - Last Filed: 07/25/18 18:00>





Provider





- Provider


Date of Admission: 


07/23/18 16:57





Attending physician: 


Reggie Lopez MD





Consults: 


KATIE Khanna


Time Spent in preparation of Discharge (in minutes): 45





Hospital Course





- Lab Results


Lab Results: 


 Most Recent Lab Values











WBC  2.0 10^3/ul (4.5-11.0)  L* D 07/25/18  07:50    


 


RBC  4.60 10^6/uL (3.5-6.1)   07/25/18  07:50    


 


Hgb  14.2 g/dL (14.0-18.0)   07/25/18  07:50    


 


Hct  40.2 % (42.0-52.0)  L  07/25/18  07:50    


 


MCV  87.4 fl (80.0-105.0)   07/25/18  07:50    


 


MCH  30.9 pg (25.0-35.0)   07/25/18  07:50    


 


MCHC  35.3 g/dl (31.0-37.0)   07/25/18  07:50    


 


RDW  14.6 % (11.5-14.5)  H  07/25/18  07:50    


 


Plt Count  45 10^3/uL (120.0-450.0)  L*  07/25/18  07:50    


 


MPV  9.6 fl (7.0-11.0)   07/25/18  07:50    


 


Gran %  65.4 % (50.0-68.0)   07/25/18  07:50    


 


Lymph % (Auto)  17.3 % (22.0-35.0)  L  07/25/18  07:50    


 


Mono % (Auto)  9.4 % (1.0-6.0)  H  07/25/18  07:50    


 


Eos % (Auto)  7.4 % (1.5-5.0)  H  07/25/18  07:50    


 


Baso % (Auto)  0.5 % (0.0-3.0)   07/25/18  07:50    


 


Gran #  1.32  (1.4-6.5)  L  07/25/18  07:50    


 


Lymph # (Auto)  0.4  (1.2-3.4)  L  07/25/18  07:50    


 


Mono # (Auto)  0.2  (0.1-0.6)   07/25/18  07:50    


 


Eos # (Auto)  0.2  (0.0-0.7)   07/25/18  07:50    


 


Baso # (Auto)  0.01 K/mm3 (0.0-2.0)   07/25/18  07:50    


 


Platelet Evaluation  Low  (NORMAL)   07/25/18  07:50    


 


Sodium  138 mmol/L (132-148)   07/25/18  07:50    


 


Potassium  3.9 mmol/L (3.6-5.0)   07/25/18  07:50    


 


Chloride  106 mmol/L ()   07/25/18  07:50    


 


Carbon Dioxide  23 mmol/L (21-33)   07/25/18  07:50    


 


Anion Gap  13  (10-20)   07/25/18  07:50    


 


BUN  9 mg/dL (7-21)   07/25/18  07:50    


 


Creatinine  0.7 mg/dl (0.8-1.5)  L  07/25/18  07:50    


 


Est GFR ( Amer)  > 60   07/25/18  07:50    


 


Est GFR (Non-Af Amer)  > 60   07/25/18  07:50    


 


Random Glucose  97 mg/dL ()   07/25/18  07:50    


 


Calcium  8.6 mg/dL (8.4-10.5)   07/25/18  07:50    


 


Phosphorus  3.1 mg/dL (2.5-4.5)   07/23/18  12:30    


 


Magnesium  1.9 mg/dL (1.7-2.2)   07/23/18  12:30    


 


Total Bilirubin  3.3 mg/dL (0.2-1.3)  H  07/25/18  07:50    


 


Direct Bilirubin  1.3 mg/dL (0.0-0.4)  H  07/23/18  12:30    


 


AST  108 U/L (17-59)  H  07/25/18  07:50    


 


ALT  48 U/L (7-56)   07/25/18  07:50    


 


Alkaline Phosphatase  221 U/L ()  H  07/25/18  07:50    


 


Total Protein  7.5 g/dL (5.8-8.3)   07/25/18  07:50    


 


Albumin  3.3 g/dL (3.0-4.8)   07/25/18  07:50    


 


Globulin  4.2 gm/dL  07/25/18  07:50    


 


Albumin/Globulin Ratio  0.8  (1.1-1.8)  L  07/25/18  07:50    


 


Alcohol, Quantitative  64 mg/dL (0-10)  H  07/23/18  12:30    


 


Hepatitis A IgM Ab  Negative  (NEGATIVE)   07/23/18  12:30    


 


Hep Bs Antigen  Negative  (NEGATIVE)   07/23/18  12:30    


 


Hep B Core IgM Ab  Negative  (NEGATIVE)   07/23/18  12:30    


 


Hepatitis C Antibody  Negative  (NEGATIVE)   07/23/18  12:30    


 


HIV 1&2 Ag/Ab, 4th Gen  Nonreactive  (Nonreactive)   07/23/18  12:30    














- Hospital Course


Hospital Course: 


José Miguel Morris DO PGY-1, Family Medicine Resident


Medicine Discharge Summary





48 y o male PMhx sleep apnea, EtOH abuse, presented to ED on 7/23/18 with c/o L 

anterior shin pain and swelling x 3 weeks. Pt denied fevers or chills. Denied 

numbness/tingling/decreased sensation to affected area. Pt had no concerns or 

pain with ambulation. Pt stated he had a bone marrow bx done several years ago 

as part of physical exam at [facility] [place], and has had chronic hx of leukopenia. Pt 

admitted to drinking 6 pack of beer almost every day of the week. Pt was thus 

admitted for LE cellulitis and pancytopenia. Pt was given vancomycin in ED. On 

day 1 inpatient, pt was started on clindamycin 600 mg IVPB q 8 h. Pt also 

received tetanus booster and pneumococcal vaccines in ED. LE doppler u/s 

demonstrated no DVT. XR of  L tibia/fibula demonstrated no signs of 

osteomyelitis. On day 2, the inflammation was decreasing and pt stated he was 

feeling much better. Blood cultures were ordered but came back negative. Pt's 

leukopenia and thrombocytopenia was monitored throughout his stay with 

bloodwork. Pt's WBC count was low, but began to trend up due to cessation of 

alcohol during hospital stay. Peripheral blood smear was done and demonstrated 

normocytic, normochromic anemia. Pt was placed on CIWA protocol due to hx of 

alcohol abuse, placed on seizure/fall/aspiration precautions, and ativan prn 

for symptoms. Pt also had elevated AST on admission. Abd U/s demonstrated 

hepatic cirrhosis, varices, and splenomegaly. Pt did not report any abdominal 

pain or tenderness. Spleen was palpable on physical exam. Pt was discharged to 

home in stable condition on 7/25/18 and instructed to complete clindamycin for 

additional 5 days of therapy. Pt was educated about alcohol cessation. Pt was 

instructed to follow-up with [facility] for follow-up appt 

on 8/2/18 at 3:00 pm, and also to f/u with heme/onc as outpatient for hx 

chronic pancytopenia. 





Discharge Exam





- Head Exam


Head Exam: ATRAUMATIC, NORMOCEPHALIC





- Eye Exam


Eye Exam: EOMI, Normal appearance, PERRL





- ENT Exam


ENT Exam: Mucous Membranes Moist, Normal Oropharynx





- Neck Exam


Neck exam: Full Rom, Normal Inspection





- Respiratory Exam


Respiratory Exam: Clear to PA & Lateral, NORMAL BREATHING PATTERN





- Cardiovascular Exam


Cardiovascular Exam: REGULAR RHYTHM, +S1, +S2





- GI/Abdominal Exam


GI & Abdominal Exam: Normal Bowel Sounds, Soft





- Extremities Exam


Extremities exam: full ROM, normal capillary refill, pedal pulses present





- Neurological Exam


Neurological exam: Alert, CN II-XII Intact, Normal Gait, Oriented x3, Reflexes 

Normal





- Psychiatric Exam


Psychiatric exam: Normal Affect, Normal Mood





- Skin


Skin Exam: Dry, Intact, Normal Color, Warm





Discharge Plan





- Discharge Medications


Prescriptions: 


Clindamycin [Cleocin] 450 mg PO TID #15 cap


Folic Acid 1 mg PO DAILY #30 tab


Mv-Min/Folic/Vit K/Lycop/Coq10 [Daily Multivitamin Capsule] 1 each PO DAILY #30 

capsule


Thiamine [Vitamin B1 Tab] 50 mg PO DAILY #14 tab





- Follow Up Plan


Condition: STABLE


Disposition: HOME/ ROUTINE


Instructions:  Wound Care (DC), Bleeding Precautions, Cellulitis (DC), 

Cellulitis (GEN), Myelodysplastic Syndromes (DC), Myelodysplastic Syndromes (GEN

)





<Reggie Lopez - Last Filed: 07/26/18 15:11>





Provider





- Provider


Date of Admission: 


07/23/18 16:57





Attending physician: 


Reggie Lopez MD








Hospital Course





- Lab Results


Lab Results: 


 Most Recent Lab Values











WBC  2.0 10^3/ul (4.5-11.0)  L* D 07/25/18  07:50    


 


RBC  4.60 10^6/uL (3.5-6.1)   07/25/18  07:50    


 


Hgb  14.2 g/dL (14.0-18.0)   07/25/18  07:50    


 


Hct  40.2 % (42.0-52.0)  L  07/25/18  07:50    


 


MCV  87.4 fl (80.0-105.0)   07/25/18  07:50    


 


MCH  30.9 pg (25.0-35.0)   07/25/18  07:50    


 


MCHC  35.3 g/dl (31.0-37.0)   07/25/18  07:50    


 


RDW  14.6 % (11.5-14.5)  H  07/25/18  07:50    


 


Plt Count  45 10^3/uL (120.0-450.0)  L*  07/25/18  07:50    


 


MPV  9.6 fl (7.0-11.0)   07/25/18  07:50    


 


Gran %  65.4 % (50.0-68.0)   07/25/18  07:50    


 


Lymph % (Auto)  17.3 % (22.0-35.0)  L  07/25/18  07:50    


 


Mono % (Auto)  9.4 % (1.0-6.0)  H  07/25/18  07:50    


 


Eos % (Auto)  7.4 % (1.5-5.0)  H  07/25/18  07:50    


 


Baso % (Auto)  0.5 % (0.0-3.0)   07/25/18  07:50    


 


Gran #  1.32  (1.4-6.5)  L  07/25/18  07:50    


 


Lymph # (Auto)  0.4  (1.2-3.4)  L  07/25/18  07:50    


 


Mono # (Auto)  0.2  (0.1-0.6)   07/25/18  07:50    


 


Eos # (Auto)  0.2  (0.0-0.7)   07/25/18  07:50    


 


Baso # (Auto)  0.01 K/mm3 (0.0-2.0)   07/25/18  07:50    


 


Differential Comment  See pathology report   07/24/18  07:30    


 


Platelet Evaluation  Low  (NORMAL)   07/25/18  07:50    


 


Sodium  138 mmol/L (132-148)   07/25/18  07:50    


 


Potassium  3.9 mmol/L (3.6-5.0)   07/25/18  07:50    


 


Chloride  106 mmol/L ()   07/25/18  07:50    


 


Carbon Dioxide  23 mmol/L (21-33)   07/25/18  07:50    


 


Anion Gap  13  (10-20)   07/25/18  07:50    


 


BUN  9 mg/dL (7-21)   07/25/18  07:50    


 


Creatinine  0.7 mg/dl (0.8-1.5)  L  07/25/18  07:50    


 


Est GFR ( Amer)  > 60   07/25/18  07:50    


 


Est GFR (Non-Af Amer)  > 60   07/25/18  07:50    


 


Random Glucose  97 mg/dL ()   07/25/18  07:50    


 


Calcium  8.6 mg/dL (8.4-10.5)   07/25/18  07:50    


 


Phosphorus  3.1 mg/dL (2.5-4.5)   07/23/18  12:30    


 


Magnesium  1.9 mg/dL (1.7-2.2)   07/23/18  12:30    


 


Total Bilirubin  3.3 mg/dL (0.2-1.3)  H  07/25/18  07:50    


 


Direct Bilirubin  1.3 mg/dL (0.0-0.4)  H  07/23/18  12:30    


 


AST  108 U/L (17-59)  H  07/25/18  07:50    


 


ALT  48 U/L (7-56)   07/25/18  07:50    


 


Alkaline Phosphatase  221 U/L ()  H  07/25/18  07:50    


 


Total Protein  7.5 g/dL (5.8-8.3)   07/25/18  07:50    


 


Albumin  3.3 g/dL (3.0-4.8)   07/25/18  07:50    


 


Globulin  4.2 gm/dL  07/25/18  07:50    


 


Albumin/Globulin Ratio  0.8  (1.1-1.8)  L  07/25/18  07:50    


 


Alcohol, Quantitative  64 mg/dL (0-10)  H  07/23/18  12:30    


 


Hepatitis A IgM Ab  Negative  (NEGATIVE)   07/23/18  12:30    


 


Hep Bs Antigen  Negative  (NEGATIVE)   07/23/18  12:30    


 


Hep B Core IgM Ab  Negative  (NEGATIVE)   07/23/18  12:30    


 


Hepatitis C Antibody  Negative  (NEGATIVE)   07/23/18  12:30    


 


HIV 1&2 Ag/Ab, 4th Gen  Nonreactive  (Nonreactive)   07/23/18  12:30    














Attending/Attestation





- Attestation


I have personally seen and examined this patient.: Yes


I have fully participated in the care of the patient.: Yes


I have reviewed all pertinent clinical information, including history, physical 

exam and plan: Yes


Notes (Text): 





07/26/18 15:09


Medical record note made by the resident after discussion with my direction and 

input after the patient was personally seen and examined by me. I have reviewed 

the chart and agree that the record accurately reflects by personal performance 

of the history, physical exam, data review, and medical decision-making, in the 

course for the patient. I have also personally directed the plan of care.





48 year old male with past medical history of alcohol abuse who presents with 

left anterior shin cellulitis. Xray was negative for evidence of osteomyelitis 

and LE Doppler was also negative.Patient has responded well to IV antibiotics, 

redness and swelling is improved.





Pancytopenia is due to bone suppression by alcohol and underlying chronic liver 

disease,Neutropenia is improving.The issue of ongoing alcohol abuse was 

discussed in detail with patient.





There is no sign of alcohol withdrawal at this time.





Management plan was discussed in detail with patient. Education was provided.

## 2018-07-25 NOTE — CP.PCM.PN
Subjective





- Date & Time of Evaluation


Date of Evaluation: 07/25/18


Time of Evaluation: 12:00





- Subjective


Subjective: 


Infectious Disease Follow Up:


July 25, 2018





49 yo  male with PMhx sleep apnea, alcohol abuse presents to the ED c/o 

L anterior shin pain and swelling x 3 weeks. Pt states he was on top of a bar 

stool at home adjusting a vent on an air conditioning unit when he lost his 

balance, the stool fell underneath him, and hit his leg in the L anterior shin 

area. Pt states for the past several weeks, the area did not heal well. Pt c/o 

"scab" in area where he bumped his leg, denies bleeding, states the scab was 

there first, and then his leg became erythematous around it, from distal to his 

L knee to proximal to L lower anterior calf. Pt has only been applying 

neosporin to affected area. Patient's girlfriend states that the laceration/

scab has not changed in size from the time of injury.  Pt states he does not 

follow with a PCP currently but was recently at Dr. Brady's office for 

sleep apnea testing and evaluation in June 2018. Pt's girlfriend states at 

bedside that he came today because it was not improving over the past 3 weeks, 

and that pt's girlfriend and pt's mother were concerned about the swelling. 

Denies fever or chills. Denies numbness/tingling/decreased sensation to 

affected area. States he has not had pain with ambulation since this started. 

Denies hx of this occurring before. Denies prior hx of cellulitis. Pt states he 

had bone marrow biopsy done 5 y ago as part of physical exam at [facility] and he had 

history of low WBCs. Pt admits to drinking a 6 pack of beer almost every day 

during the week, admits to occasionally mixing with liquor. Reports last drink 

of alcohol was at 2 am this morning. Denies headache, dizziness, palpitations, 

diaphoresis, chest pain, shortness of breath, n/v/d/c, abd pain, urinary 

complaints, tremors, or other symptoms.





The patient with no erythema, additional warmth, or palpational tenderness.  No 

drainage from the large laceration that is scabbed over.  Patient still with 

pain at site.  However no issues with walking or gait.








Objective





- Vital Signs/Intake and Output


Vital Signs (last 24 hours): 


 











Temp Pulse Resp BP Pulse Ox


 


 98.5 F   71   20   116/71   94 L


 


 07/25/18 06:00  07/25/18 06:00  07/25/18 06:00  07/25/18 06:00  07/25/18 06:00








Intake and Output: 


 











 07/25/18 07/25/18





 06:59 18:59


 


Intake Total 840 


 


Balance 840 














- Labs


Labs: 


 





 07/25/18 07:50 





 07/25/18 07:50 











- Constitutional


Appears: Non-toxic, No Acute Distress





- Head Exam


Head Exam: ATRAUMATIC, NORMOCEPHALIC


Additional comments: 


dentition below average





- Eye Exam


Eye Exam: EOMI, PERRL


Pupil Exam: NORMAL ACCOMODATION, PERRL





- ENT Exam


ENT Exam: Mucous Membranes Moist, Normal External Ear Exam, TM's Normal 

Bilaterally





- Neck Exam


Neck Exam: Full ROM, Normal Inspection





- Respiratory Exam


Respiratory Exam: Clear to Ausculation Bilateral, NORMAL BREATHING PATTERN.  

absent: Rales, Rhonchi, Wheezes





- Cardiovascular Exam


Cardiovascular Exam: REGULAR RHYTHM, RRR, +S1, +S2





- GI/Abdominal Exam


GI & Abdominal Exam: Soft, Normal Bowel Sounds.  absent: Distended, Tenderness





- Extremities Exam


Extremities Exam: Full ROM


Additional comments: 


mild chronic venous stasis changes.  Scabbed laceration about 4 cm in length on 

anterior left lower leg.  No current drainage.  At time of my exam, no 

additional warmth or tenderness.





- Neurological Exam


Neurological Exam: Alert, Awake, CN II-XII Intact, Oriented x3





- Psychiatric Exam


Psychiatric exam: Normal Affect, Normal Mood





- Skin


Skin Exam: Normal Color


Additional comments: 


except for anterior lower left leg with 4 cm laceration that is scabbed over.





Assessment and Plan





- Assessment and Plan (Free Text)


Assessment: 


49 yo  male with injury to left leg 3 weeks ago when he fell off a bar 

stool while trying to fix something in the house.  As per wife, the left leg 

has had a non-healing laceration on the anterior lower left leg.  Patient was 

only using Neosporin ointment for treatment.  Currently he has no major 

complaints.  Local wound care.  On Clindamycin for antibiotic care at this 

time.  Can continue with Clindamycin.





IF leg continues to improve, can consider use of Clindamycin 450 mg PO TID to 

continue treatment on discharge for at least 14 days of treatment.  Supportive 

care.





Thank you for allowing me to participate in the care of the patient, we will 

follow with you.

## 2019-02-18 ENCOUNTER — HOSPITAL ENCOUNTER (OUTPATIENT)
Dept: HOSPITAL 42 - RAD | Age: 49
End: 2019-02-18
Payer: MEDICAID

## 2019-05-14 ENCOUNTER — HOSPITAL ENCOUNTER (OUTPATIENT)
Dept: HOSPITAL 42 - RAD | Age: 49
End: 2019-05-14
Payer: MEDICAID

## 2019-05-14 DIAGNOSIS — K70.30: Primary | ICD-10-CM
